# Patient Record
Sex: MALE | Race: WHITE | NOT HISPANIC OR LATINO
[De-identification: names, ages, dates, MRNs, and addresses within clinical notes are randomized per-mention and may not be internally consistent; named-entity substitution may affect disease eponyms.]

---

## 2022-02-18 ENCOUNTER — RESULT REVIEW (OUTPATIENT)
Age: 42
End: 2022-02-18

## 2022-04-08 PROBLEM — Z00.00 ENCOUNTER FOR PREVENTIVE HEALTH EXAMINATION: Status: ACTIVE | Noted: 2022-04-08

## 2022-04-19 ENCOUNTER — APPOINTMENT (OUTPATIENT)
Dept: ORTHOPEDIC SURGERY | Facility: CLINIC | Age: 42
End: 2022-04-19

## 2022-11-17 ENCOUNTER — APPOINTMENT (OUTPATIENT)
Dept: ORTHOPEDIC SURGERY | Facility: CLINIC | Age: 42
End: 2022-11-17

## 2022-11-17 VITALS — BODY MASS INDEX: 31.15 KG/M2 | WEIGHT: 230 LBS | HEIGHT: 72 IN

## 2022-11-17 DIAGNOSIS — Z86.718 PERSONAL HISTORY OF OTHER VENOUS THROMBOSIS AND EMBOLISM: ICD-10-CM

## 2022-11-17 DIAGNOSIS — Z87.39 PERSONAL HISTORY OF OTHER DISEASES OF THE MUSCULOSKELETAL SYSTEM AND CONNECTIVE TISSUE: ICD-10-CM

## 2022-11-17 DIAGNOSIS — Z78.9 OTHER SPECIFIED HEALTH STATUS: ICD-10-CM

## 2022-11-17 DIAGNOSIS — D68.51 ACTIVATED PROTEIN C RESISTANCE: ICD-10-CM

## 2022-11-17 PROCEDURE — 99214 OFFICE O/P EST MOD 30 MIN: CPT
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
## 2022-11-17 NOTE — HISTORY OF PRESENT ILLNESS
[de-identified] : Follow up lumbar spine. Experiencing pain and stiffness. Having numbness in bilateral feet, left is worse. Having difficulty with left foot drop. Denies pain medication.

## 2022-11-17 NOTE — ASSESSMENT
[FreeTextEntry1] : Patient given prescription for MRI, follow up after study is completed to discuss results.\par \par Patient will begin Medrol.  Patient advised not to take any NSAIDs while taking the steroids.\par \par Recommend: - NSAID - Heating pad - Muscle relaxer - Core strengthening exercise - Hamstring stretching exercise Patient is given back rehabilitation exercise book.\par

## 2022-11-30 ENCOUNTER — RESULT REVIEW (OUTPATIENT)
Age: 42
End: 2022-11-30

## 2022-12-15 ENCOUNTER — APPOINTMENT (OUTPATIENT)
Dept: ORTHOPEDIC SURGERY | Facility: CLINIC | Age: 42
End: 2022-12-15

## 2022-12-15 PROCEDURE — 99214 OFFICE O/P EST MOD 30 MIN: CPT

## 2022-12-15 NOTE — HISTORY OF PRESENT ILLNESS
[de-identified] : F/U Lumbar MRI. Pt reports no change in pain since last visit. No improvement in foot drop, reports increase in numbness. Reports no relief with Medrol Dose pack. Reports finishing PT post op and doing HEP. Taking Tylenol prn. \par
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
## 2022-12-15 NOTE — DATA REVIEWED
[MRI] : MRI [Lumbar Spine] : lumbar spine [Report was reviewed and noted in the chart] : The report was reviewed and noted in the chart [I independently reviewed and interpreted images and report] : I independently reviewed and interpreted images and report [FreeTextEntry1] : Multilevel lumbar spondylosis \par DDD L3-S1\par Large left paracentral HNP L4-5 \par Mild stenosis L3-4

## 2022-12-15 NOTE — ASSESSMENT
[FreeTextEntry1] : Multilevel lumbar spondylosis \par DDD L3-S1\par Large left paracentral HNP L4-5 \par Mild stenosis L3-4 \par \par Referral to pain management for injections, follow up 2 weeks after injection. \par \par Patient will begin physical therapy. \par \par Recommend: - NSAID - Heating pad - Muscle relaxer - Core strengthening exercise - Hamstring stretching exercise Patient is given back rehabilitation exercise book.\par \par Follow up in 2 months \par

## 2022-12-20 ENCOUNTER — APPOINTMENT (OUTPATIENT)
Dept: PAIN MANAGEMENT | Facility: CLINIC | Age: 42
End: 2022-12-20

## 2023-01-19 ENCOUNTER — OFFICE (OUTPATIENT)
Dept: URBAN - METROPOLITAN AREA CLINIC 104 | Facility: CLINIC | Age: 43
Setting detail: OPHTHALMOLOGY
End: 2023-01-19
Payer: COMMERCIAL

## 2023-01-19 DIAGNOSIS — Z01.11: ICD-10-CM

## 2023-01-19 PROCEDURE — 93000 ELECTROCARDIOGRAM COMPLETE: CPT | Performed by: SPECIALIST

## 2023-01-19 PROCEDURE — 90002 FAA EXAMINATION: CPT | Performed by: SPECIALIST

## 2023-01-19 ASSESSMENT — CONFRONTATIONAL VISUAL FIELD TEST (CVF)
OS_FINDINGS: FULL
OD_FINDINGS: FULL

## 2023-01-23 ASSESSMENT — VISUAL ACUITY
OS_BCVA: 20/20
OD_BCVA: 20/20

## 2023-01-24 ENCOUNTER — APPOINTMENT (OUTPATIENT)
Dept: PAIN MANAGEMENT | Facility: CLINIC | Age: 43
End: 2023-01-24
Payer: COMMERCIAL

## 2023-01-24 VITALS — HEIGHT: 72 IN | BODY MASS INDEX: 31.15 KG/M2 | WEIGHT: 230 LBS

## 2023-01-24 PROCEDURE — 99204 OFFICE O/P NEW MOD 45 MIN: CPT

## 2023-01-24 RX ORDER — RIVAROXABAN 10 MG/1
10 TABLET, FILM COATED ORAL
Qty: 90 | Refills: 0 | Status: DISCONTINUED | COMMUNITY
Start: 2022-07-21 | End: 2023-01-24

## 2023-01-24 RX ORDER — RIVAROXABAN 15 MG-20MG
15 & 20 KIT ORAL
Qty: 51 | Refills: 0 | Status: DISCONTINUED | COMMUNITY
Start: 2022-09-29 | End: 2023-01-24

## 2023-01-24 RX ORDER — METHYLPREDNISOLONE 4 MG/1
4 TABLET ORAL
Qty: 1 | Refills: 0 | Status: DISCONTINUED | COMMUNITY
Start: 2022-11-17 | End: 2023-01-24

## 2023-01-24 NOTE — ASSESSMENT
[FreeTextEntry1] : This is ZORAN MYLES,  a 43 year-old male here for lower back pain with impairment in ADLs and functionality.  The pain has not responded to conservative care including NSAID therapy and/or physical therapy.  There is no bleeding tendency, unstable medical condition, or systemic infection.\par \par Based on history and physical, I suspect there are likely multiple pain generators, including left L4/L5 HNP/foraminal stenosis and right L3/L4 disc bulge/stenosis.\par \par I discussed the above at length with the patient, including prognosis, complications, and red flag symptoms.  We discussed a graded and multidisciplinary treatment plan, including physical therapy/HEP, medications, and/or interventional procedures.  The risks and benefits of each of these were addressed and all questions answered to the patient's apparent satisfaction.  After this discussion, the following plan was developed with the patient: \par \par 1. PT: Emphasized importance of PT/HEP as a mainstay of treatment. Pt to continue HEP\par 2.  Medication(s): cannot take neuropathic agents 2/2 job as \par 3.  Imaging/Labs: reviewed as above\par 4.  Procedure(s): Recommend right L3/L4 and Left L4/5 TFESI with fluoroscopic guidance.  Patient will need hematology clearance to hold xarelto x3d prior and 24h after ESIs from Dr. Andrew Jimenez, Hematology ([facility])\par 5.  Follow-Up: for procedure, then 2 weeks after. \par \par The risks, benefits and alternatives of the proposed procedure were explained in detail with the patient. The risks outlined include but are not limited to infection, bleeding, post- dural puncture headache (for DIONICIO), nerve injury, a temporary increase in pain, failure to resolve symptoms, allergic reaction, and possible elevation of blood sugar in diabetics if using corticosteroid.  All questions were answered to patient's apparent satisfaction and he/she verbalized an understanding.\par \par Medications have been discussed and reconciled, adverse reactions and side effects have been reviewed with patient.  When appropriate, iSTOP/ has been checked and any aberrations discussed with patient.  \par \par

## 2023-01-24 NOTE — HISTORY OF PRESENT ILLNESS
[Lower back] : lower back [6] : 6 [Dull/Aching] : dull/aching [Sharp] : sharp [Constant] : constant [Household chores] : household chores [Meds] : meds [Nothing helps with pain getting better] : Nothing helps with pain getting better [Sitting] : sitting [Standing] : standing [Walking] : walking [FreeTextEntry1] : Initial HPI: \par \par 01/24/2023: This is ZORAN MYLES, a pleasant 43 year-old male referred to my office by Dr. Oquendo for initial evaluation of lower back pain\par \par Location of maximal pain: b/l lower back, left>right\par Onset: chronic, but worsened 1-2mos ago NKI\par Radiation pattern: into b/l buttocks\par Associated symptoms: numbness of all five toes and forefoot of left foot with diminished sensation to ankle; similar but less severe on right.  Has drop foot of left foot for many years due to old L4/5 HNP for which he had surgery; foot drop persists.  \par Patient denies bowel/bladder incontinence, saddle anesthesia, fevers, chills, weight loss, or recent falls.  \par \par Current Pain Medications:\par None\par \par Past Pain Medications:\par MDP in December - no relief\par \par Prior Procedures/Treatments: \par Distant hx of DIONICIO with no relief\par \par History of back surgery: \par 2/2022: Dr. Oquendo - L4-S1 laminectimy; L4/5 microdiscectomy, Left L4 and L5 root duraplasty \par ?2014: L4/5 microdiscectomy at [facility] \par \par Blood thinners:\par Xarelto 20mg - factor 5 Leiden mutation (Dr. Andrew Jimeenz, Hematology ([facility]))\par \par Physical Therapy:\par Has done in the past; continues to do HEP.\par \par --------------------------------------------------------\par Imaging Review:\par \par 11/2022  MRI-LUMBAR SPINE PRE AND POST IV CONTRAST ([facility])\par COMPARISON: Lumbar spine MRI dated 2/12/2022\par FINDINGS:\par SEGMENTATION: Normal.\par ALIGNMENT: There is no scoliosis.\par BONES: Vertebral body heights are maintained. Marrow signal is within normal\par limits.\par CONUS: Normal in signal and morphology.\par CANAL: No congenital narrowing of the spinal canal.\par DISCS: See details below\par L1-L2: There is no disc bulge, herniation, or stenosis.\par L2-L3: There is no disc bulge, herniation, or stenosis.\par L3-L4: Type II Modic endplate changes in Schmorl's nodes are noted. There is\par disc height loss and a disc bulge impressing upon the thecal sac resulting in\par mild central and mild bilateral foraminal stenosis.\par L4-L5: Type II Modic endplate changes and Schmorl's nodes are noted. Bilateral\par laminectomies noted. There is a disc bulge asymmetric to the left without\par central stenosis. There is mild right and moderate left foraminal stenosis with\par this material abutting the exiting left L4 nerve root.\par L5-S1: Bilateral laminectomies noted. There is a central disc herniation\par impressing upon the ventral epidural fat. There is no central or foraminal\par stenosis.\par \par The facet joints at the lumbar spine are unremarkable.\par \par There is no abnormal enhancement.\par \par The visualized abdominal and pelvic structures are unremarkable.\par \par IMPRESSION:\par \par Interval laminectomies at L4-L5 and L5-S1.\par \par L3-L4: Type II Modic endplate changes in Schmorl's nodes are noted. There is\par disc height loss and a disc bulge impressing upon the thecal sac resulting in\par mild central and mild bilateral foraminal stenosis.\par \par L4-L5: Type II Modic endplate changes and Schmorl's nodes are noted. Bilateral\par laminectomies noted. There is a disc bulge asymmetric to the left without\par central stenosis. There is mild right and moderate left foraminal stenosis with\par this material abutting the exiting left L4 nerve root.\par \par L5-S1: Bilateral laminectomies noted. There is a central disc herniation\par impressing upon the ventral epidural fat. There is no central or foraminal\par stenosis.     \par \par \par \par All pertinent imaging independently reviewed and interpreted by me.  \par \par  [] : no [FreeTextEntry7] : B/L LEGS  [de-identified] : L MRI

## 2023-02-06 ENCOUNTER — APPOINTMENT (OUTPATIENT)
Dept: PAIN MANAGEMENT | Facility: CLINIC | Age: 43
End: 2023-02-06

## 2023-02-09 ENCOUNTER — APPOINTMENT (OUTPATIENT)
Dept: ORTHOPEDIC SURGERY | Facility: CLINIC | Age: 43
End: 2023-02-09

## 2023-02-23 ENCOUNTER — APPOINTMENT (OUTPATIENT)
Dept: PAIN MANAGEMENT | Facility: CLINIC | Age: 43
End: 2023-02-23

## 2023-03-14 ENCOUNTER — APPOINTMENT (OUTPATIENT)
Dept: PAIN MANAGEMENT | Facility: CLINIC | Age: 43
End: 2023-03-14

## 2023-05-03 ENCOUNTER — APPOINTMENT (OUTPATIENT)
Dept: ORTHOPEDIC SURGERY | Facility: CLINIC | Age: 43
End: 2023-05-03
Payer: COMMERCIAL

## 2023-05-03 VITALS — BODY MASS INDEX: 31.15 KG/M2 | HEIGHT: 72 IN | WEIGHT: 230 LBS

## 2023-05-03 PROCEDURE — 99214 OFFICE O/P EST MOD 30 MIN: CPT

## 2023-05-15 NOTE — ASSESSMENT
[FreeTextEntry1] : Laminectomy L3-S1\par \par Multilevel lumbar spondylosis \par DDD L3-S1\par Large left paracentral HNP L4-5 \par Mild stenosis L3-4 \par \par Patient given prescription for EMG/NCS, follow up after study is completed to discuss results. \par \par Referral to pain management for injections, follow up 2 weeks after injection. \par \par Patient will continue HEP \par \par Recommend: - NSAID - Heating pad - Muscle relaxer - Core strengthening exercise - Hamstring stretching exercise Patient is given back rehabilitation exercise book.\par

## 2023-05-15 NOTE — HISTORY OF PRESENT ILLNESS
[de-identified] : Follow up lumbar spine. Saw Dr. Padilla, only had a consultation. States he feels increased pain and has numbness in bilateral feet. Denies going to PT. Admits to doing HEP. Denies taking pain medication. \par  15-Oct-2020 06:42
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
## 2023-05-16 ENCOUNTER — APPOINTMENT (OUTPATIENT)
Dept: NEUROLOGY | Facility: CLINIC | Age: 43
End: 2023-05-16
Payer: COMMERCIAL

## 2023-05-16 PROCEDURE — 95912 NRV CNDJ TEST 11-12 STUDIES: CPT

## 2023-05-31 ENCOUNTER — APPOINTMENT (OUTPATIENT)
Dept: ORTHOPEDIC SURGERY | Facility: CLINIC | Age: 43
End: 2023-05-31
Payer: COMMERCIAL

## 2023-05-31 VITALS — WEIGHT: 230 LBS | BODY MASS INDEX: 31.15 KG/M2 | HEIGHT: 72 IN

## 2023-05-31 PROCEDURE — 99213 OFFICE O/P EST LOW 20 MIN: CPT

## 2023-06-07 NOTE — DATA REVIEWED
[EMG Nerve Conduction] : A EMG Nerve Conduction test was completed of the [Bilateral] : bilateral [Lower extremity] : lower extremity [Positive] : positive [Consistent with radiculopathy] : consistent with radiculopathy [FreeTextEntry1] : Left L4, L5, S1 radiculopathy

## 2023-06-07 NOTE — HISTORY OF PRESENT ILLNESS
[de-identified] : Follow up lumbar spine and EMG Results. States he has constant pain. Admits to doing HEP. Admits to taking MEthocarbamol PRN.

## 2023-06-07 NOTE — ASSESSMENT
[FreeTextEntry1] : Surgery - Open laminectomy L3-S1\par \par Left L4, L5, S1 radiculopathy \par \par Multilevel lumbar spondylosis \par DDD L3-S1\par Large left paracentral HNP L4-5 \par Mild stenosis L3-4 \par \par Patient will continue HEP \par \par Recommend: - NSAID - Heating pad - Muscle relaxer - Core strengthening exercise - Hamstring stretching exercise Patient is given back rehabilitation exercise book.\par \par
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
## 2023-06-07 NOTE — DISCUSSION/SUMMARY
[Surgical risks reviewed] : Surgical risks reviewed [de-identified] : I discussed non operative and operative treatment options in great detail with the patient. I discussed treatment options, including but not limited to,\par 1. Non operative treatment - rest, NSAID, physical therapy etc.\par 2. Interventional treatment - injections etc.\par 3. Surgical treatment - laminectomy L3-4, 4-5, 5-S1. \par \par Patient wants to proceed with surgical intervention after failing nonoperative care. I had a lengthy discussion with the patient about the rational and goal of the surgery as well as expected outcome. I encouraged patient to seek a second opinion regarding surgery. I explained postoperative rehabilitation and recovery process to the patient. I discussed risks, benefits and alternatives of the procedure in detail with the patient. I counseled patient about risks and possible complications, including but not limited to, infection, bleeding, nerve injury, arterial and venous injury, single or multiple muscle group weakness, dural tear, CSF leak, pseudomeningocele, arachnoiditis, CSF fistula, meningitis, discitis, osteomyelitis, epidural hematoma, DVT, PE, CRPS, MI, paralysis, post laminectomy instability, need for fusion,, adjacent segment disease, persistent symptoms, and risks of anesthesia. I explained to the patient that the surgical outcome is unpredictable and there is no guarantee that the symptoms will resolve after the surgery. The patient understands and wishes to proceed. All questions were answered and patient was given information to review.\par

## 2023-06-11 ENCOUNTER — FORM ENCOUNTER (OUTPATIENT)
Age: 43
End: 2023-06-11

## 2023-06-13 ENCOUNTER — FORM ENCOUNTER (OUTPATIENT)
Age: 43
End: 2023-06-13

## 2023-06-14 ENCOUNTER — FORM ENCOUNTER (OUTPATIENT)
Age: 43
End: 2023-06-14

## 2023-06-15 ENCOUNTER — FORM ENCOUNTER (OUTPATIENT)
Age: 43
End: 2023-06-15

## 2023-06-15 ENCOUNTER — APPOINTMENT (OUTPATIENT)
Dept: PHYSICAL MEDICINE AND REHAB | Facility: CLINIC | Age: 43
End: 2023-06-15
Payer: COMMERCIAL

## 2023-06-15 VITALS
RESPIRATION RATE: 16 BRPM | OXYGEN SATURATION: 97 % | SYSTOLIC BLOOD PRESSURE: 122 MMHG | HEART RATE: 105 BPM | TEMPERATURE: 97.7 F | DIASTOLIC BLOOD PRESSURE: 88 MMHG | WEIGHT: 236 LBS | HEIGHT: 72 IN | BODY MASS INDEX: 31.97 KG/M2

## 2023-06-15 DIAGNOSIS — B95.8 OTHER SPECIFIED DISORDERS OF NOSE AND NASAL SINUSES: ICD-10-CM

## 2023-06-15 DIAGNOSIS — J34.89 OTHER SPECIFIED DISORDERS OF NOSE AND NASAL SINUSES: ICD-10-CM

## 2023-06-15 DIAGNOSIS — D68.51 ACTIVATED PROTEIN C RESISTANCE: ICD-10-CM

## 2023-06-15 DIAGNOSIS — Z01.818 ENCOUNTER FOR OTHER PREPROCEDURAL EXAMINATION: ICD-10-CM

## 2023-06-15 DIAGNOSIS — I42.9 CARDIOMYOPATHY, UNSPECIFIED: ICD-10-CM

## 2023-06-15 DIAGNOSIS — Z78.9 OTHER SPECIFIED HEALTH STATUS: ICD-10-CM

## 2023-06-15 PROCEDURE — 99204 OFFICE O/P NEW MOD 45 MIN: CPT

## 2023-06-15 RX ORDER — METHOCARBAMOL 750 MG/1
750 TABLET, FILM COATED ORAL 3 TIMES DAILY
Qty: 90 | Refills: 0 | Status: DISCONTINUED | COMMUNITY
Start: 2023-05-11 | End: 2023-06-15

## 2023-06-15 RX ORDER — TADALAFIL 5 MG/1
5 TABLET ORAL
Qty: 90 | Refills: 0 | Status: DISCONTINUED | COMMUNITY
Start: 2022-09-24 | End: 2023-06-15

## 2023-06-19 NOTE — REVIEW OF SYSTEMS
[Muscle Weakness] : muscle weakness [Negative] : Heme/Lymph [Fever] : no fever [Chills] : no chills [Fatigue] : no fatigue [Night Sweats] : no night sweats [Recent Change In Weight] : ~T no recent weight change [Joint Pain] : no joint pain [Joint Stiffness] : no joint stiffness [Muscle Pain] : no muscle pain [Back Pain] : no back pain [Joint Swelling] : no joint swelling [FreeTextEntry9] : Chronic left foot weakness

## 2023-06-19 NOTE — PHYSICAL EXAM
[No Acute Distress] : no acute distress [Well Nourished] : well nourished [Well Developed] : well developed [Well-Appearing] : well-appearing [Normal Sclera/Conjunctiva] : normal sclera/conjunctiva [PERRL] : pupils equal round and reactive to light [EOMI] : extraocular movements intact [Normal Outer Ear/Nose] : the outer ears and nose were normal in appearance [Normal Oropharynx] : the oropharynx was normal [Normal TMs] : both tympanic membranes were normal [No JVD] : no jugular venous distention [No Lymphadenopathy] : no lymphadenopathy [Supple] : supple [No Respiratory Distress] : no respiratory distress  [No Accessory Muscle Use] : no accessory muscle use [Clear to Auscultation] : lungs were clear to auscultation bilaterally [Regular Rhythm] : with a regular rhythm [Normal S1, S2] : normal S1 and S2 [No Murmur] : no murmur heard [No Edema] : there was no peripheral edema [Soft] : abdomen soft [Non Tender] : non-tender [Non-distended] : non-distended [No Masses] : no abdominal mass palpated [Normal Anterior Cervical Nodes] : no anterior cervical lymphadenopathy [No CVA Tenderness] : no CVA  tenderness [No Joint Swelling] : no joint swelling [Grossly Normal Strength/Tone] : grossly normal strength/tone [No Rash] : no rash [Coordination Grossly Intact] : coordination grossly intact [Normal Gait] : normal gait [Speech Grossly Normal] : speech grossly normal [Normal Affect] : the affect was normal [Normal Mood] : the mood was normal [Normal Insight/Judgement] : insight and judgment were intact [de-identified] : Tachycardia  [de-identified] : Lumbar spinal tenderness and left paraspinal tenderness.  [de-identified] : Left lower mid leg decreased sensation distally. Left Dorsi flexion weakness 3/5.

## 2023-06-19 NOTE — HISTORY OF PRESENT ILLNESS
[FreeTextEntry1] : Pre-operative medical evaluation  [de-identified] : Patient presents today for pre-operative medical evaluation as requested by Dr. Oquendo for Laminectomy L3-4, 5-4, 5-S1 which is scheduled to be performed on 6-. Patient complains of chronic lower back pain and intermittent episodes of radiculopathy and lower back spasm. He states he is otherwise feeling well and has no other complaints at this time. Denies any chest pain, shortness of breath, fever, chills, cough or cold type symptoms.\par Patient performed PST at [facility]. \par \par \par Cardiac: no aortic stenosis, no atrial fibrillation, no coronary artery disease, no recent myocardial infarction and no implantable device/pacemaker. Patient has a sultana of Cardiomyopathy for which he is being followed by his Cardiologist Dr. Virgilio Mendiola [facility]. \par Pulmonary: no asthma, no COPD, no sleep apnea and not a smoker. \par Adverse Anesthesia Reaction: no adverse anesthesia reaction in self or family member, but no family member with adverse anesthesia reaction/sudden death and no previous adverse anesthesia reaction. \par Other: no chronic kidney disease and no diabetes. Patient has a sultana of Factor V Leiden for which he is on Xarelto daily and is being followed by Dr. Tony DELACRUZ. \par \par Cardiovascular Symptoms: Patient denies any chest pain, claudication, dyspnea on exertion, orthopnea, palpitations or syncope \par \par

## 2023-06-19 NOTE — PLAN
[FreeTextEntry1] : Pending Cxray \par EKG - Sinus Tachycardia- 103, MISTI - 0.126,  No ST or T wave changes noted.  \par Pending appointment for Cardiac Clearance with Dr. Mendiola on 6- and results to be faxed to office. \par Completed Hematology Clearance with Dr. Jimenez on 6-. Patient advised to discontinue Xarelto 48 hours prior to surgery and restart at surgeons discretion. \par \par S. Aureus infection of nose\par - Start Mupirocin ointment as prescribed. \par \par CBC and CMP reviewed. \par Patient to discontinue NSAIDS and herbal supplement at this time. \par Discontinue Spironolactone on the day of surgery. \par Patient to continue with present medications up to and including the day of surgery.   \par \par 45 minutes was spent with patient reviewing findings/results during this visit. Ample time was provided to answer any questions or address concerns to the patients satisfaction..\par \par 
within normal limits
within normal limits

## 2023-06-19 NOTE — ADDENDUM
[FreeTextEntry1] : Cxray reviewed and normal. \par \par At this time the patient is medically stable to proceed with proposed procedure pending hematology and cardiac clearance. \par \par Thank you for allowing me the opportunity to be part of your patients care. \par \par Kind Regards, \par \par Alex Ayala NP \par [facility]\par

## 2023-06-20 ENCOUNTER — APPOINTMENT (OUTPATIENT)
Dept: ORTHOPEDIC SURGERY | Facility: HOSPITAL | Age: 43
End: 2023-06-20
Payer: COMMERCIAL

## 2023-06-20 PROCEDURE — 69990 MICROSURGERY ADD-ON: CPT | Mod: AS

## 2023-06-20 PROCEDURE — 63047 LAM FACETEC & FORAMOT LUMBAR: CPT | Mod: AS

## 2023-06-20 PROCEDURE — 69990 MICROSURGERY ADD-ON: CPT

## 2023-06-20 PROCEDURE — 64722 DECOMPRESSION UNSPEC NERVE: CPT

## 2023-06-20 PROCEDURE — 63048 LAM FACETEC &FORAMOT EA ADDL: CPT

## 2023-06-20 PROCEDURE — 76000 FLUOROSCOPY <1 HR PHYS/QHP: CPT | Mod: 26,59

## 2023-06-20 PROCEDURE — 63047 LAM FACETEC & FORAMOT LUMBAR: CPT

## 2023-06-20 PROCEDURE — 63048 LAM FACETEC &FORAMOT EA ADDL: CPT | Mod: AS

## 2023-06-20 PROCEDURE — 64722 DECOMPRESSION UNSPEC NERVE: CPT | Mod: AS

## 2023-06-29 RX ORDER — METHYLPREDNISOLONE 4 MG/1
4 TABLET ORAL
Qty: 1 | Refills: 0 | Status: ACTIVE | COMMUNITY
Start: 2023-06-29 | End: 1900-01-01

## 2023-06-29 RX ORDER — HYDROMORPHONE HYDROCHLORIDE 2 MG/1
2 TABLET ORAL
Qty: 28 | Refills: 0 | Status: ACTIVE | COMMUNITY
Start: 2023-06-29 | End: 1900-01-01

## 2023-07-05 ENCOUNTER — APPOINTMENT (OUTPATIENT)
Dept: ORTHOPEDIC SURGERY | Facility: CLINIC | Age: 43
End: 2023-07-05
Payer: COMMERCIAL

## 2023-07-05 VITALS — BODY MASS INDEX: 31.97 KG/M2 | HEIGHT: 72 IN | WEIGHT: 236 LBS

## 2023-07-05 PROCEDURE — 99024 POSTOP FOLLOW-UP VISIT: CPT

## 2023-07-05 NOTE — HISTORY OF PRESENT ILLNESS
[de-identified] : S/P Laminectomy L3-4, 4-5, L5-S1 on 6/20/23. Denies fevers, chills, SOB. Pain is 5/10. Admits to finishing Medrol pack with some relief. States he has returned to work, causing increased pain. Admits to taking Methocarbamol PRN.
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
## 2023-07-05 NOTE — ASSESSMENT
[FreeTextEntry1] : Left L4, L5, S1 radiculopathy \par \par Patients pain improved after steroids \par \par Begin gentle ROM exercises, leg stretching/strengthening.  No bending, lifting or twisting for 6 weeks.\par \par Unable to have NSAIDs due to Xarelto. \par \par patient will check with his job to see if he is able to take Gabapentin.\par \par Recommend: - NSAID - Heating pad - Muscle relaxer - Core strengthening exercise - Hamstring stretching exercise Patient is given back rehabilitation exercise book.\par \par Follow up in 1 month \par \par

## 2023-07-19 ENCOUNTER — NON-APPOINTMENT (OUTPATIENT)
Age: 43
End: 2023-07-19

## 2023-08-10 ENCOUNTER — APPOINTMENT (OUTPATIENT)
Dept: ORTHOPEDIC SURGERY | Facility: CLINIC | Age: 43
End: 2023-08-10
Payer: COMMERCIAL

## 2023-08-10 VITALS — HEIGHT: 72 IN | BODY MASS INDEX: 31.97 KG/M2 | WEIGHT: 236 LBS

## 2023-08-10 PROCEDURE — 99024 POSTOP FOLLOW-UP VISIT: CPT

## 2023-08-10 NOTE — ASSESSMENT
[FreeTextEntry1] : Left L4, L5, S1 radiculopathy   Patient will begin physical therapy.   Recommend: - NSAID - Heating pad - Muscle relaxer - Core strengthening exercise - Hamstring stretching exercise Patient is given back rehabilitation exercise book.  Follow up in 2 months,
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
## 2023-08-10 NOTE — HISTORY OF PRESENT ILLNESS
[de-identified] : S/P Laminectomy L3-4, 4-5, L5-S1 on 6/20/23. Patient states he is feeling an improvement. Patient states he is unable to take Gabapentin with his job. Patient denies taking any pain medication.

## 2023-08-24 RX ORDER — DAPAGLIFLOZIN 10 MG/1
10 TABLET, FILM COATED ORAL DAILY
Qty: 90 | Refills: 0 | Status: ACTIVE | COMMUNITY
Start: 2023-08-24

## 2023-08-24 RX ORDER — METOPROLOL SUCCINATE 100 MG/1
100 TABLET, EXTENDED RELEASE ORAL TWICE DAILY
Refills: 0 | Status: ACTIVE | COMMUNITY

## 2023-08-24 RX ORDER — SPIRONOLACTONE 25 MG/1
25 TABLET, FILM COATED ORAL
Refills: 0 | Status: ACTIVE | COMMUNITY

## 2023-10-12 ENCOUNTER — APPOINTMENT (OUTPATIENT)
Dept: ORTHOPEDIC SURGERY | Facility: CLINIC | Age: 43
End: 2023-10-12

## 2023-12-01 NOTE — PHYSICAL EXAM
Your discharge plan includes access to our free Care Transitions program.     As your Care Transition Nurse I will contact you via phone within 2 business days after your discharge from the hospital. Please have your discharge instructions and medications ready for review. Over the next 30 days I will call you at least once a week. I am able to assist with arranging follow-up appointments. I have direct contact with your physicians and will alert them with any health or medication concerns or relay your questions.     Your Care Transitions Nurse is Shira Asencio RN    If you have any questions or concerns after your discharge and prior to our first call, you can reach me at [phone].    
[de-identified] : General:  Awake and alert in no acute distress\par Psych: normal mood and affect\par HEENT: NC/AT, normal visual tracking\par Pulmonary: no increased work of breathing\par CV: extremities are warm and perfused\par Abd: non-distended\par Ext: no c/c/e\par \par Inspection: Non-antalgic gait\par \par Palpation: Tender at lumbar paraspinals\par \par ROM: \par Flexion - full, mild pain\par Extension -  full, mild pain\par Oblique Extension -  full, mild pain\par \par \par Strength:  HF, KE, KF, DF, PF, EHL all 5/5 except left ankle DF 1/5\par \par Sensation: Intact at L2-S1 dermatomes bilaterally to light touch except diminished in b/l distal extremities\par \par Reflexes: 2+/4 at bilateral Patellar (L2-4) and Achilles (S1).\par \par Special Tests: \par SLR: (R) - negative; (L) - negative  \par Facet Loading: (R) - negative   ; (L) - negative  \par \par \par \par

## 2024-05-16 ENCOUNTER — NON-APPOINTMENT (OUTPATIENT)
Age: 44
End: 2024-05-16

## 2024-05-29 ENCOUNTER — APPOINTMENT (OUTPATIENT)
Dept: ORTHOPEDIC SURGERY | Facility: CLINIC | Age: 44
End: 2024-05-29
Payer: COMMERCIAL

## 2024-05-29 DIAGNOSIS — M51.27 OTHER INTERVERTEBRAL DISC DISPLACEMENT, LUMBOSACRAL REGION: ICD-10-CM

## 2024-05-29 DIAGNOSIS — M47.817 SPONDYLOSIS W/OUT MYELOPATHY OR RADICULOPATHY, LUMBOSACRAL REGION: ICD-10-CM

## 2024-05-29 DIAGNOSIS — Z87.39 PERSONAL HISTORY OF OTHER DISEASES OF THE MUSCULOSKELETAL SYSTEM AND CONNECTIVE TISSUE: ICD-10-CM

## 2024-05-29 DIAGNOSIS — M54.16 RADICULOPATHY, LUMBAR REGION: ICD-10-CM

## 2024-05-29 DIAGNOSIS — Z98.890 OTHER SPECIFIED POSTPROCEDURAL STATES: ICD-10-CM

## 2024-05-29 DIAGNOSIS — M48.061 SPINAL STENOSIS, LUMBAR REGION WITHOUT NEUROGENIC CLAUDICATION: ICD-10-CM

## 2024-05-29 PROCEDURE — 99214 OFFICE O/P EST MOD 30 MIN: CPT

## 2024-05-29 PROCEDURE — 72100 X-RAY EXAM L-S SPINE 2/3 VWS: CPT

## 2024-05-29 RX ORDER — MELOXICAM 15 MG/1
15 TABLET ORAL
Qty: 30 | Refills: 0 | Status: ACTIVE | COMMUNITY
Start: 2024-05-29 | End: 1900-01-01

## 2024-05-29 NOTE — HISTORY OF PRESENT ILLNESS
[de-identified] : Follow up lumbar spine. Patient states he was in severe pain radiating down his left leg 3 weeks ago. Patient states he has constant aching and numbness. Patient admits to going for Acupuncture 2x a week with some relief. Patient denies taking any pain medication.  Today's Pain 2-3/10
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
## 2024-05-29 NOTE — ASSESSMENT
[FreeTextEntry1] : EMG B/L LE: Left L4, L5, S1 radiculopathy   45 yo male presents today for eval of his low back pain and left sided foot drop. XR shows progression of DDD L4-S1. Patient with persistent left foot drop, weakness on the left side. I recommend proceeding with new MRI of the low back to further evaluate for degree of nerve involvement, especially with persistent symptoms despite previous laminectomy.   - Patient given prescription for MRI, follow up after study is completed to discuss results.   - Patient given prescription for Meloxicam 15mg today. Advised patient to take once a day with food and to discontinue usage of other NSAIDs concurrently. Educated patient on potential adverse effects of long term NSAID use, including development of gastric ulcers and renal injury.   - Recommend NSAIDs PRN - Recommend heating pad use to decrease muscle spasm - Discussed the importance of home exercises, including but not limited to hamstring stretching and core strengthening   Patient was educated on their diagnosis today. All questions answered and patient expressed understanding.  Follow up after MRI

## 2024-06-28 ENCOUNTER — APPOINTMENT (OUTPATIENT)
Dept: ORTHOPEDIC SURGERY | Facility: CLINIC | Age: 44
End: 2024-06-28

## 2025-01-14 ENCOUNTER — OFFICE (OUTPATIENT)
Dept: URBAN - METROPOLITAN AREA CLINIC 104 | Facility: CLINIC | Age: 45
Setting detail: OPHTHALMOLOGY
End: 2025-01-14
Payer: COMMERCIAL

## 2025-01-14 DIAGNOSIS — Z01.11: ICD-10-CM

## 2025-01-14 PROCEDURE — 9000C FAA EXAMINATION- COMPLEX: CPT | Performed by: SPECIALIST

## 2025-01-14 ASSESSMENT — CONFRONTATIONAL VISUAL FIELD TEST (CVF)
OS_FINDINGS: FULL
OD_FINDINGS: FULL

## 2025-01-14 ASSESSMENT — VISUAL ACUITY
OS_BCVA: 20/20
OD_BCVA: 20/20